# Patient Record
Sex: FEMALE | Race: BLACK OR AFRICAN AMERICAN | NOT HISPANIC OR LATINO | Employment: UNEMPLOYED | URBAN - METROPOLITAN AREA
[De-identification: names, ages, dates, MRNs, and addresses within clinical notes are randomized per-mention and may not be internally consistent; named-entity substitution may affect disease eponyms.]

---

## 2022-07-15 ENCOUNTER — APPOINTMENT (OUTPATIENT)
Dept: LAB | Facility: HOSPITAL | Age: 47
End: 2022-07-15
Payer: COMMERCIAL

## 2022-07-15 DIAGNOSIS — G40.209 COMPLEX PARTIAL SEIZURES WITH CONSCIOUSNESS IMPAIRED (HCC): ICD-10-CM

## 2022-07-15 LAB
25(OH)D3 SERPL-MCNC: 25.8 NG/ML (ref 30–100)
ALBUMIN SERPL BCP-MCNC: 3.4 G/DL (ref 3.5–5)
ALP SERPL-CCNC: 104 U/L (ref 46–116)
ALT SERPL W P-5'-P-CCNC: 19 U/L (ref 12–78)
ANION GAP SERPL CALCULATED.3IONS-SCNC: 6 MMOL/L (ref 4–13)
AST SERPL W P-5'-P-CCNC: 12 U/L (ref 5–45)
BILIRUB SERPL-MCNC: 0.29 MG/DL (ref 0.2–1)
BUN SERPL-MCNC: 11 MG/DL (ref 5–25)
CALCIUM ALBUM COR SERPL-MCNC: 9.3 MG/DL (ref 8.3–10.1)
CALCIUM SERPL-MCNC: 8.8 MG/DL (ref 8.3–10.1)
CHLORIDE SERPL-SCNC: 102 MMOL/L (ref 100–108)
CO2 SERPL-SCNC: 31 MMOL/L (ref 21–32)
CREAT SERPL-MCNC: 1.09 MG/DL (ref 0.6–1.3)
ERYTHROCYTE [DISTWIDTH] IN BLOOD BY AUTOMATED COUNT: 12.9 % (ref 11.6–15.1)
GFR SERPL CREATININE-BSD FRML MDRD: 60 ML/MIN/1.73SQ M
GLUCOSE P FAST SERPL-MCNC: 93 MG/DL (ref 65–99)
HCT VFR BLD AUTO: 43.3 % (ref 34.8–46.1)
HGB BLD-MCNC: 13.6 G/DL (ref 11.5–15.4)
MCH RBC QN AUTO: 28.2 PG (ref 26.8–34.3)
MCHC RBC AUTO-ENTMCNC: 31.4 G/DL (ref 31.4–37.4)
MCV RBC AUTO: 90 FL (ref 82–98)
PLATELET # BLD AUTO: 305 THOUSANDS/UL (ref 149–390)
PMV BLD AUTO: 9.1 FL (ref 8.9–12.7)
POTASSIUM SERPL-SCNC: 3.8 MMOL/L (ref 3.5–5.3)
PROT SERPL-MCNC: 7.6 G/DL (ref 6.4–8.2)
RBC # BLD AUTO: 4.82 MILLION/UL (ref 3.81–5.12)
SODIUM SERPL-SCNC: 139 MMOL/L (ref 136–145)
WBC # BLD AUTO: 5.54 THOUSAND/UL (ref 4.31–10.16)

## 2022-07-15 PROCEDURE — 80175 DRUG SCREEN QUAN LAMOTRIGINE: CPT

## 2022-07-15 PROCEDURE — 80177 DRUG SCRN QUAN LEVETIRACETAM: CPT

## 2022-07-15 PROCEDURE — 36415 COLL VENOUS BLD VENIPUNCTURE: CPT

## 2022-07-15 PROCEDURE — 82306 VITAMIN D 25 HYDROXY: CPT

## 2022-07-15 PROCEDURE — 85027 COMPLETE CBC AUTOMATED: CPT

## 2022-07-15 PROCEDURE — 80053 COMPREHEN METABOLIC PANEL: CPT

## 2022-07-18 LAB — LAMOTRIGINE SERPL-MCNC: 9.7 UG/ML (ref 2–20)

## 2022-07-21 LAB — LEVETIRACETAM SERPL-MCNC: 22.2 UG/ML (ref 10–40)

## 2022-11-04 ENCOUNTER — HOSPITAL ENCOUNTER (EMERGENCY)
Facility: HOSPITAL | Age: 47
Discharge: HOME | End: 2022-11-04
Attending: EMERGENCY MEDICINE

## 2022-11-04 ENCOUNTER — APPOINTMENT (EMERGENCY)
Dept: RADIOLOGY | Facility: HOSPITAL | Age: 47
End: 2022-11-04

## 2022-11-04 VITALS
HEART RATE: 68 BPM | RESPIRATION RATE: 18 BRPM | DIASTOLIC BLOOD PRESSURE: 80 MMHG | TEMPERATURE: 98.3 F | SYSTOLIC BLOOD PRESSURE: 150 MMHG | OXYGEN SATURATION: 98 %

## 2022-11-04 DIAGNOSIS — S93.401A RIGHT ANKLE SPRAIN: Primary | ICD-10-CM

## 2022-11-04 RX ORDER — LEVETIRACETAM 500 MG/1
1500 TABLET, EXTENDED RELEASE ORAL 2 TIMES DAILY
COMMUNITY
Start: 2022-10-18

## 2022-11-04 RX ORDER — LAMOTRIGINE 50 MG/1
50 TABLET, EXTENDED RELEASE ORAL DAILY
COMMUNITY
Start: 2022-10-18

## 2022-11-04 RX ORDER — LAMOTRIGINE 100 MG/1
300 TABLET, EXTENDED RELEASE ORAL DAILY
COMMUNITY
Start: 2022-10-18

## 2022-11-04 RX ORDER — AMLODIPINE BESYLATE 10 MG/1
10 TABLET ORAL DAILY
COMMUNITY

## 2022-11-04 NOTE — ED PROVIDER NOTES
History  Chief Complaint   Patient presents with   • Ankle Injury     Patient had a slip and fall at home and injured her right ankle, top of right foot, and has a bruise on her right calf  No loc  No head injury  18-year-old female presents emergency room with right ankle pain  Patient states that she slipped and fell down 2 steps at home a few weeks ago and has had pain since  She states that she is able to ambulate on the extremity but with a limp  She denies any prior surgery or injury to the area  Denies any other injury after the fall  She has tried ice and Tylenol with minimal relief  No other complaints  History provided by:  Patient  Ankle Injury  Location:  Right ankle  Quality:  Pain  Severity:  Mild  Onset quality:  Sudden  Timing:  Constant  Progression:  Worsening  Chronicity:  New  Context:  Fall at home  Relieved by:  Nothing  Worsened by:  Ambulation  Ineffective treatments:  Tylenol and ice  Associated symptoms: no abdominal pain, no chest pain, no congestion, no cough, no diarrhea, no ear pain, no fever, no headaches, no nausea, no rash, no shortness of breath, no sore throat, no vomiting and no wheezing        Prior to Admission Medications   Prescriptions Last Dose Informant Patient Reported? Taking?    Cenobamate 200 MG TABS   Yes Yes   Sig: Take 200 mg by mouth in the morning   Cenobamate 50 MG TABS   Yes Yes   Sig: Take 50 mg by mouth in the morning 250mg daily   amLODIPine (NORVASC) 10 mg tablet   Yes Yes   Sig: Take 10 mg by mouth daily   lamoTRIgine  MG TB24   Yes Yes   Sig: Take 300 mg by mouth daily   lamoTRIgine ER 50 MG TB24   Yes Yes   Sig: Take 50 mg by mouth daily   levETIRAcetam (KEPPRA XR) 500 MG extended-release tablet   Yes Yes   Sig: Take 1,500 mg by mouth 2 (two) times a day      Facility-Administered Medications: None       Past Medical History:   Diagnosis Date   • Asthma    • Hypertension    • Seizures ([facility] [address] )        Past Surgical History:   Procedure Laterality Date   • HYSTERECTOMY         History reviewed  No pertinent family history  I have reviewed and agree with the history as documented  E-Cigarette/Vaping   • E-Cigarette Use Never User      E-Cigarette/Vaping Substances     Social History     Tobacco Use   • Smoking status: Never Smoker   • Smokeless tobacco: Never Used   Vaping Use   • Vaping Use: Never used   Substance Use Topics   • Alcohol use: Never   • Drug use: Never       Review of Systems   Constitutional: Negative for chills and fever  HENT: Negative for congestion, ear pain and sore throat  Eyes: Negative for pain and visual disturbance  Respiratory: Negative for cough, shortness of breath and wheezing  Cardiovascular: Negative for chest pain and leg swelling  Gastrointestinal: Negative for abdominal pain, diarrhea, nausea and vomiting  Genitourinary: Negative for dysuria, frequency, hematuria and urgency  Musculoskeletal: Negative for neck pain and neck stiffness  Skin: Negative for rash and wound  Neurological: Negative for weakness, numbness and headaches  Psychiatric/Behavioral: Negative for agitation and confusion  All other systems reviewed and are negative  Physical Exam  Physical Exam  Vitals and nursing note reviewed  Constitutional:       Appearance: She is well-developed  HENT:      Head: Normocephalic and atraumatic  Eyes:      Pupils: Pupils are equal, round, and reactive to light  Cardiovascular:      Rate and Rhythm: Normal rate and regular rhythm  Pulmonary:      Effort: Pulmonary effort is normal       Breath sounds: Normal breath sounds  Abdominal:      General: Bowel sounds are normal       Palpations: Abdomen is soft  Musculoskeletal:         General: Normal range of motion  Cervical back: Normal range of motion and neck supple  Comments: Right ankle- tenderness over the lateral malleolus with mild swelling  No erythema, warmth  Neurovascularly intact distally  Skin:     General: Skin is warm and dry  Neurological:      General: No focal deficit present  Mental Status: She is alert and oriented to person, place, and time  Comments: No focal deficits         Vital Signs  ED Triage Vitals [11/04/22 0837]   Temperature Pulse Respirations Blood Pressure SpO2   98 3 °F (36 8 °C) 68 18 150/80 98 %      Temp Source Heart Rate Source Patient Position - Orthostatic VS BP Location FiO2 (%)   Oral -- Sitting Right arm --      Pain Score       5           Vitals:    11/04/22 0837   BP: 150/80   Pulse: 68   Patient Position - Orthostatic VS: Sitting         Visual Acuity      ED Medications  Medications - No data to display    Diagnostic Studies  Results Reviewed     None                 XR ankle 3+ views RIGHT   Final Result by Sheryle Manual, MD (11/04 1524)      Ankle joint effusion and soft tissue swelling without an acute osseous abnormality  Workstation performed: [id number]                    Procedures  Procedures         ED Course                               SBIRT 20yo+    Flowsheet Row Most Recent Value   SBIRT (23 yo +)    In order to provide better care to our patients, we are screening all of our patients for alcohol and drug use  Would it be okay to ask you these screening questions? Yes Filed at: 11/04/2022 0830   Initial Alcohol Screen: US AUDIT-C     1  How often do you have a drink containing alcohol? 0 Filed at: 11/04/2022 0830   2  How many drinks containing alcohol do you have on a typical day you are drinking? 0 Filed at: 11/04/2022 0830   3a  Male UNDER 65: How often do you have five or more drinks on one occasion? 0 Filed at: 11/04/2022 0830   3b  FEMALE Any Age, or MALE 65+: How often do you have 4 or more drinks on one occassion? 0 Filed at: 11/04/2022 0830   Audit-C Score 0 Filed at: 11/04/2022 0830   ANTHONY: How many times in the past year have you        Used an illegal drug or used a prescription medication for non-medical reasons? Never Filed at: 11/04/2022 0830                    [facility]  Number of Diagnoses or Management Options  Right ankle sprain: new and requires workup  Diagnosis management comments: Patient with right ankle pain- will get x-ray to rule out fracture  Offered pain medications to patient that she does not want any at this time  Patient reevaluated and feels improved  Patient updated on results of tests  Discharge instructions given including follow-up, and return precautions  Patient demonstrates verbal understanding and agrees with plan  Amount and/or Complexity of Data Reviewed  Clinical lab tests: ordered and reviewed  Tests in the radiology section of CPT®: ordered and reviewed  Tests in the medicine section of CPT®: ordered and reviewed  Discussion of test results with the performing providers: yes  Decide to obtain previous medical records or to obtain history from someone other than the patient: yes  Obtain history from someone other than the patient: yes  Review and summarize past medical records: yes  Discuss the patient with other providers: yes  Independent visualization of images, tracings, or specimens: yes    Patient Progress  Patient progress: improved      Disposition  Final diagnoses:   Right ankle sprain     Time reflects when diagnosis was documented in both MDM as applicable and the Disposition within this note     Time User Action Codes Description Comment    11/4/2022 10:13 AM Kalli Check A Add [I22 222A] Right ankle sprain       ED Disposition     ED Disposition   Discharge    Condition   Stable    Date/Time   Fri Nov 4, 2022 10:12 AM    Comment   Ayaan Sainz discharge to home/self care                 Follow-up Information     Follow up With Specialties Details Why Contact Info Additional [address] [facility] Call in 1 day for follow up within 1 week [facility]  Damian Bustamante [address] Specialists [facility], [address] [address] [facility], [facility], NEW [facility], [address]    [facility] Emergency Medicine Go to  immediately for any new or worsening symptoms Jairo Madden [address] [place] [id number] [facility], [address], [facility], [facility], [address]          Discharge Medication List as of 11/4/2022 10:14 AM      CONTINUE these medications which have NOT CHANGED    Details   amLODIPine (NORVASC) 10 mg tablet Take 10 mg by mouth daily, Historical Med      !! Cenobamate 200 MG TABS Take 200 mg by mouth in the morning, Starting Tue 10/18/2022, Historical Med      !! Cenobamate 50 MG TABS Take 50 mg by mouth in the morning 250mg daily, Starting Tue 10/18/2022, Historical Med      !! lamoTRIgine  MG TB24 Take 300 mg by mouth daily, Starting Tue 10/18/2022, Historical Med      !! lamoTRIgine ER 50 MG TB24 Take 50 mg by mouth daily, Starting Tue 10/18/2022, Historical Med      levETIRAcetam (KEPPRA XR) 500 MG extended-release tablet Take 1,500 mg by mouth 2 (two) times a day, Starting Tue 10/18/2022, Historical Med       !! - Potential duplicate medications found  Please discuss with provider  No discharge procedures on file      PDMP Review     None          ED Provider  Electronically Signed by           Angelita Bullard DO  11/04/22 9286

## 2022-11-15 ENCOUNTER — OFFICE VISIT (OUTPATIENT)
Dept: OBGYN CLINIC | Facility: CLINIC | Age: 47
End: 2022-11-15

## 2022-11-15 ENCOUNTER — HOSPITAL ENCOUNTER (OUTPATIENT)
Dept: MRI IMAGING | Facility: HOSPITAL | Age: 47
Discharge: HOME | End: 2022-11-15
Attending: ORTHOPAEDIC SURGERY

## 2022-11-15 VITALS
HEIGHT: 64 IN | DIASTOLIC BLOOD PRESSURE: 77 MMHG | SYSTOLIC BLOOD PRESSURE: 121 MMHG | WEIGHT: 264.8 LBS | HEART RATE: 67 BPM | BODY MASS INDEX: 45.21 KG/M2

## 2022-11-15 DIAGNOSIS — M76.821 TIBIALIS POSTERIOR TENDONITIS, RIGHT: ICD-10-CM

## 2022-11-15 DIAGNOSIS — S86.001A INJURY OF RIGHT ACHILLES TENDON, INITIAL ENCOUNTER: ICD-10-CM

## 2022-11-15 DIAGNOSIS — S86.001A INJURY OF RIGHT ACHILLES TENDON, INITIAL ENCOUNTER: Primary | ICD-10-CM

## 2022-11-15 RX ORDER — ALBUTEROL SULFATE 90 UG/1
2 AEROSOL, METERED RESPIRATORY (INHALATION) EVERY 6 HOURS PRN
COMMUNITY

## 2022-11-15 NOTE — PROGRESS NOTES
Orthopaedics Office Visit - New Patient Visit    ASSESSMENT/PLAN:    Assessment:   Suspected partial tear of achilles tendon  Soreness anterior ankle/tib ant muscle from compensation    Plan:   · STAT MRI for right ankle to rule out achilles tendon tear  · CAM boot with heel lifts    · NSAIDs as needed for pain  · Follow-up once MRI results are available     To Do Next Visit:  MRI results review    _____________________________________________________  CHIEF COMPLAINT:  Chief Complaint   Patient presents with   • Right Ankle - Pain         SUBJECTIVE:  Alphonse De Leon is a 49 y o  female who presents for evaluation of her right ankle  The patient was treated in the ER on 11/4/22 for a right ankle sprain  She fell down 2 stairs at home approximately 10/2/22  She tried conservative measures including Tylenol with minimal relief in her symptoms  She has pain with certain motions  Pain is described as achy, throbbing  She reports she is limping while walking  She is consciously trying to sep up with the left before the right  She notes she has pain at the top of the foot and at the achilles  She notes selling at the ankle  She has not tried physical therapy  The patient has no prior surgeries or injections to this ankle  She is currently not working  PAST MEDICAL HISTORY:  Past Medical History:   Diagnosis Date   • Asthma    • Hypertension    • Seizures ([address] )        PAST SURGICAL HISTORY:  Past Surgical History:   Procedure Laterality Date   • HYSTERECTOMY         FAMILY HISTORY:  History reviewed  No pertinent family history      SOCIAL HISTORY:  Social History     Tobacco Use   • Smoking status: Never Smoker   • Smokeless tobacco: Never Used   Vaping Use   • Vaping Use: Never used   Substance Use Topics   • Alcohol use: Never   • Drug use: Never       MEDICATIONS:    Current Outpatient Medications:   •  albuterol (PROVENTIL HFA,VENTOLIN HFA) 90 mcg/act inhaler, Inhale 2 puffs every 6 (six) hours as needed for wheezing, Disp: , Rfl:   •  amLODIPine (NORVASC) 10 mg tablet, Take 10 mg by mouth daily, Disp: , Rfl:   •  Cenobamate 200 MG TABS, Take 200 mg by mouth in the morning, Disp: , Rfl:   •  Cenobamate 50 MG TABS, Take 50 mg by mouth in the morning 250mg daily, Disp: , Rfl:   •  lamoTRIgine  MG TB24, Take 300 mg by mouth daily, Disp: , Rfl:   •  lamoTRIgine ER 50 MG TB24, Take 50 mg by mouth daily, Disp: , Rfl:   •  levETIRAcetam (KEPPRA XR) 500 MG extended-release tablet, Take 1,500 mg by mouth 2 (two) times a day, Disp: , Rfl:     ALLERGIES:  Allergies   Allergen Reactions   • Penicillins Rash       REVIEW OF SYSTEMS:  MSK: right ankle   Neuro: WNL  Pertinent items are otherwise noted in HPI  A comprehensive review of systems was otherwise negative  LABS:  HgA1c: No results found for: HGBA1C  BMP:   Lab Results   Component Value Date    CALCIUM 8 8 07/15/2022    K 3 8 07/15/2022    CO2 31 07/15/2022     07/15/2022    BUN 11 07/15/2022    CREATININE 1 09 07/15/2022     CBC: No components found for: CBC    _____________________________________________________  PHYSICAL EXAMINATION:  Vital signs: /77   Pulse 67   Ht 5' 4" (1 626 m)   Wt 120 kg (264 lb 12 8 oz)   BMI 45 45 kg/m²   General: No acute distress, awake and alert  Psychiatric: Mood and affect appear appropriate  HEENT: Trachea Midline, No torticollis, no apparent facial trauma  Cardiovascular: No audible murmurs; Extremities appear perfused  Pulmonary: No audible wheezing or stridor  Skin: No open lesions; see further details (if any) below    MUSCULOSKELETAL EXAMINATION:  Extremities:      Right Ankle  Active ROM is well maintained in all planes   There is swelling present over the lateral and medial malleolus  There is tenderness present over the achilles tendon at area of musculotendinous junction     There is pain with resisted dorsiflexion, plantar flexion and eversion  Anterior drawer test is negative      Talar tilt test causes discomfort at the posteromedial    Sensation is intact to light touch superficial peroneal, deep peroneal, tibial, saphenous, and sural nerve distributions  2+ DP pulse present         _____________________________________________________  STUDIES REVIEWED:  I personally reviewed the images and interpretation is as follows:    X-rays, R ankle, 11/4/22: Demonstrates no acute osseous abnormalities or fractures  PROCEDURES PERFORMED:  Procedures  None performed         Scribe Attestation    I,:  Waldo Dominguez am acting as a scribe while in the presence of the attending physician :       I,:  Parviz Lawrence MD personally performed the services described in this documentation    as scribed in my presence :

## 2022-11-21 ENCOUNTER — TELEPHONE (OUTPATIENT)
Dept: OBGYN CLINIC | Facility: MEDICAL CENTER | Age: 47
End: 2022-11-21

## 2022-11-21 DIAGNOSIS — S93.491A HIGH ANKLE SPRAIN OF RIGHT LOWER EXTREMITY, INITIAL ENCOUNTER: Primary | ICD-10-CM

## 2022-11-21 NOTE — TELEPHONE ENCOUNTER
Caller: Patient    Doctor: Anish Mohr    Reason for call:     Patient has an appointment for 11/22/22 at 10:00 am, she cannot make the appointment  She is asking if she can do a virtual instead for the MRI follow up appointment  email ([email or web address])  Please call her to let her know if the appointment status can be changed to virual     Call back#: [phone]

## 2022-12-09 ENCOUNTER — EVALUATION (OUTPATIENT)
Dept: PHYSICAL THERAPY | Facility: CLINIC | Age: 47
End: 2022-12-09

## 2022-12-09 DIAGNOSIS — S93.491A HIGH ANKLE SPRAIN OF RIGHT LOWER EXTREMITY, INITIAL ENCOUNTER: ICD-10-CM

## 2022-12-09 NOTE — PROGRESS NOTES
PT Evaluation     Today's date: 2022  Patient name: Tiny Velasquez  : 1975  MRN: [id number]  Referring provider: CLEVELAND Lomax*  Dx:   Encounter Diagnosis     ICD-10-CM    1  High ankle sprain of right lower extremity, initial encounter  S98 543R Ambulatory Referral to Physical Therapy                     Assessment  Assessment details: Juan Serra is a 49year old female presenting to physical therapy with chief complaints of R ankle and foot pain  MRI imaging demonstrated an osteochondral lesion of the medial talar dome and syndesmotic injury with low-grade sprain of the anterior inferior tibia-fibula ligament and interosseous syndesmotic ligaments; grade 2 sprain of the posterior inferior tibia-fibula ligament  She presents with decreased motor control of the foot and ankle, decreased range of motion, decreased activity tolerance, and decreased strength in the foot  Patient was provided a home exercise program and demonstrated an understanding of exercises  Patient was advised to stop performing home exercise program if symptoms increase or new complaints developed  Verbal understanding demonstrated regarding home exercise program instructions  Patient would benefit from skilled physical therapy services for prescribed exercises, manual interventions, neuromuscular re-education, education, and modalities as deemed appropriate to assist patient in achieving their maximum level of function    Impairments: abnormal gait, abnormal or restricted ROM, activity intolerance, impaired physical strength, lacks appropriate home exercise program and pain with function    Goals  STG - 4 weeks  Decrease subjective points by 2 points  Achieve neutral position of active dorsiflexion  LTG - 12 weeks  Patient will be able to ambulate community distance without functional limitation  Patient will be able to go up and down stairs in a reciprocal pattern  Patient will be able to complete squat based activities  Patient will be able to complete household chores and IADLs without functional limitation  Plan  Patient would benefit from: skilled physical therapy  Planned modality interventions: cryotherapy  Planned therapy interventions: manual therapy, joint mobilization, neuromuscular re-education, patient education, strengthening, stretching, therapeutic activities, therapeutic exercise, home exercise program, balance, balance/weight bearing training and activity modification  Frequency: 1-2x per week  Duration in weeks: 12        Subjective Evaluation    History of Present Illness  Mechanism of injury: Patient had a twist and fall injury while going down the stairs in November on the RLE  She did have a MRI of the R foot and notes that it demonstrated a chondral defect and a high ankle sprain  She has been wearing a CAM boot and has been feeling better when walking with the boot, but when not wearing her boot she does feel unstable  Pain  Current pain ratin  At worst pain rating: 3          Objective     Tenderness     Right Ankle/Foot   Tenderness in the Achilles insertion, fibula and lateral malleolus  Additional Tenderness Details  Tenderness along present throughout the syndesmosis region     Active Range of Motion     Right Ankle/Foot   Dorsiflexion (ke): -5 degrees   Plantar flexion: 12 degrees   Inversion: 5 degrees   Eversion: 5 degrees     Passive Range of Motion     Right Ankle/Foot    Dorsiflexion (ke): 0 degrees   Plantar flexion: 12 degrees   Inversion: 10 degrees   Eversion: 7 degrees     Strength/Myotome Testing     Right Ankle/Foot   Dorsiflexion: 3  Plantar flexion: 3  Inversion: 3  Eversion: 3    Ambulation     Comments   Antalgic with the use of CAM boot    General Comments:       Ankle/Foot Comments   Swelling noted on the lateral malleolus              Precautions: hx of seizures       Manuals             Ankle PROM GM Neuro Re-Ed                                                                                                        Ther Ex             Ankle [place] 20xea            Ankle ABC 20xea            Seated HR/TR 20xea            Education GM                                                                Ther Activity                                       Gait Training                                       Modalities

## 2022-12-16 ENCOUNTER — OFFICE VISIT (OUTPATIENT)
Dept: PHYSICAL THERAPY | Facility: CLINIC | Age: 47
End: 2022-12-16

## 2022-12-16 DIAGNOSIS — S93.491A HIGH ANKLE SPRAIN OF RIGHT LOWER EXTREMITY, INITIAL ENCOUNTER: Primary | ICD-10-CM

## 2022-12-16 NOTE — PROGRESS NOTES
Daily Note     Today's date: 2022  Patient name: Kaleb Sultana  : 1975  MRN: [id number]  Referring provider: CLEVELAND Lyons*  Dx:   Encounter Diagnosis     ICD-10-CM    1  High ankle sprain of right lower extremity, initial encounter  S93 491A           Start Time: 1105  Stop Time: 1200  Total time in clinic (min): 55 minutes    Subjective: Pt denies any medical changes since previous visit, she describes pain as mild to moderate  Objective: See treatment diary below      Assessment: Hue tolerated treatment well with consistent cuing throughout  TE's were performed with the same resistance and reps  New TE's were demonstrated with proper technique, and tolerated well  Following treatment, the patient exhibited good technique with therapeutic exercises, would benefit from continued PT and and notes sx improvement with manual techniques at the end of her treatment session            Plan: Continue per plan of care        Precautions: hx of seizures       Manuals             Ankle PROM GM            Retrograde massage                                       Neuro Re-Ed                                                                                                        Ther Ex             Ankle [place] 20xea 20x ea           Ankle ABC 20xea 2x ea           Seated HR/TR 20xea 20xea           Education [facility]           Seated Rocker Board DF/PF, Circles   20x ea           Supine SLR   20xea                                      Ther Activity                                       Gait Training                                       Modalities             Ice Prn  8'

## 2022-12-23 ENCOUNTER — APPOINTMENT (OUTPATIENT)
Dept: PHYSICAL THERAPY | Facility: CLINIC | Age: 47
End: 2022-12-23

## 2022-12-30 ENCOUNTER — APPOINTMENT (OUTPATIENT)
Dept: PHYSICAL THERAPY | Facility: CLINIC | Age: 47
End: 2022-12-30

## 2024-03-04 PROBLEM — Z00.00 ENCOUNTER FOR PREVENTIVE HEALTH EXAMINATION: Status: ACTIVE | Noted: 2024-03-04

## 2024-03-14 ENCOUNTER — APPOINTMENT (OUTPATIENT)
Age: 49
End: 2024-03-14

## 2024-03-14 ENCOUNTER — OUTPATIENT (OUTPATIENT)
Dept: OUTPATIENT SERVICES | Facility: HOSPITAL | Age: 49
LOS: 1 days | End: 2024-03-14
Payer: COMMERCIAL

## 2024-03-14 PROCEDURE — 72141 MRI NECK SPINE W/O DYE: CPT

## 2024-03-14 PROCEDURE — 72141 MRI NECK SPINE W/O DYE: CPT | Mod: 26

## 2024-03-14 PROCEDURE — 72040 X-RAY EXAM NECK SPINE 2-3 VW: CPT | Mod: 26

## 2024-03-14 PROCEDURE — 72040 X-RAY EXAM NECK SPINE 2-3 VW: CPT

## 2024-03-15 ENCOUNTER — NON-APPOINTMENT (OUTPATIENT)
Age: 49
End: 2024-03-15

## 2024-03-15 ENCOUNTER — APPOINTMENT (OUTPATIENT)
Age: 49
End: 2024-03-15
Payer: COMMERCIAL

## 2024-03-15 VITALS
BODY MASS INDEX: 43.87 KG/M2 | TEMPERATURE: 97.8 F | WEIGHT: 257 LBS | SYSTOLIC BLOOD PRESSURE: 148 MMHG | HEIGHT: 64 IN | RESPIRATION RATE: 17 BRPM | HEART RATE: 69 BPM | DIASTOLIC BLOOD PRESSURE: 85 MMHG | OXYGEN SATURATION: 95 %

## 2024-03-15 DIAGNOSIS — S84.90XA INJURY OF UNSPECIFIED NERVE AT LOWER LEG LEVEL, UNSPECIFIED LEG, INITIAL ENCOUNTER: ICD-10-CM

## 2024-03-15 DIAGNOSIS — M54.12 RADICULOPATHY, CERVICAL REGION: ICD-10-CM

## 2024-03-15 DIAGNOSIS — G56.03 CARPAL TUNNEL SYNDROM,BILATERAL UPPER LIMBS: ICD-10-CM

## 2024-03-15 PROCEDURE — 99205 OFFICE O/P NEW HI 60 MIN: CPT

## 2024-03-15 NOTE — HISTORY OF PRESENT ILLNESS
[FreeTextEntry1] : GLORY DENT is a 48 year who presents with numbness  Epileptic patient of Dr Jorge Eaton at [facility].    about 4 months of intermittent symptoms first noted numbness in left more often than right hand in sleep. wakes up shakes it and it improved.   currently having pressure in left neck all the time since about 2016. was right after a seizure.  this stayed chronic left neck/head numbness increases after seizures numbness for about a minutes and then persistent pressure.  this year the arm has been more persistently numb.  if she is sitting on the toilet for too long legs feel numb.  generally takes at least several minutes.    She had a fall in 2012.   Denies diplopia, blurred vision, dysphagia, dysarthria, aphasia, bowel or bladder dysfunction, imbalance, falls, headaches.

## 2024-03-15 NOTE — CONSULT LETTER
[Dear  ___] : Dear  [unfilled], [Courtesy Letter:] : I had the pleasure of seeing your patient, [unfilled], in my office today. [Please see my note below.] : Please see my note below. [Consult Closing:] : Thank you very much for allowing me to participate in the care of this patient.  If you have any questions, please do not hesitate to contact me. [Sincerely,] : Sincerely, [FreeTextEntry3] : Harvey August MD [DrAyesha  ___] : Dr. SIMPSON

## 2024-03-15 NOTE — DATA REVIEWED
[de-identified] : MRI C spine yesterday mild spinal stenosis without cord signal change, worst neural foramen is L C5/C6

## 2024-03-15 NOTE — PHYSICAL EXAM
[FreeTextEntry1] : General: this is a pleasant patient in no acute distress  HEENT conjunctiva are normal, no tenderness in head  CV: normal pulses, regular rate and rhythm, no peripheral edema noted  Lungs: breathing is non-labored  abd: soft and non-distended  MSK: SLR:  YAZ: range of motion: mild restricted C spine lat rot tinnels neg at wrists and elbows phalens + b/l  spurling: neg Occipital nerve tenderness: denies  Mental status: Alert and oriented to person, place and time, normal speech and comprehension  Cranial Nerves: extra-occular movements in tact without nystagmus, normal saccades and smooth pursuit, Face symmetric and facial strength symmetric, facial sensation symmetric,   Motor: normal bulk and tone throughout. no abnormal movements.  Full 5/5 strength uppers and lower extremities proximally and distally   Sensory: in tact and symmetric to vibration, light tough, temperature except R 5th digit decreased temp  Cerebellar: normal finger-nose-finger bilaterally  Reflexes: 2+ in the upper and lower extremities and symmetric.  toes are bilaterally downgoing.  Gait: stable, able to tip toe heel and tandem  Stances: Romberg: normal Shapened romberg: normal